# Patient Record
Sex: MALE | Race: WHITE | NOT HISPANIC OR LATINO | URBAN - METROPOLITAN AREA
[De-identification: names, ages, dates, MRNs, and addresses within clinical notes are randomized per-mention and may not be internally consistent; named-entity substitution may affect disease eponyms.]

---

## 2024-10-01 ENCOUNTER — TRANSCRIBE ORDERS (OUTPATIENT)
Dept: ADMINISTRATIVE | Facility: HOSPITAL | Age: 44
End: 2024-10-01
Payer: COMMERCIAL

## 2024-10-01 ENCOUNTER — HOSPITAL ENCOUNTER (OUTPATIENT)
Dept: GENERAL RADIOLOGY | Facility: HOSPITAL | Age: 44
Discharge: HOME | End: 2024-10-01
Admitting: ANESTHESIOLOGY
Payer: COMMERCIAL

## 2024-10-01 DIAGNOSIS — M54.16 LUMBAR RADICULOPATHY: Primary | ICD-10-CM

## 2024-10-01 DIAGNOSIS — M54.16 LUMBAR RADICULOPATHY: ICD-10-CM

## 2024-10-01 PROCEDURE — 72100 X-RAY EXAM L-S SPINE 2/3 VWS: CPT

## 2024-11-04 ENCOUNTER — TRANSCRIBE ORDERS (OUTPATIENT)
Dept: ADMINISTRATIVE | Facility: HOSPITAL | Age: 44
End: 2024-11-04
Payer: COMMERCIAL

## 2024-11-04 DIAGNOSIS — M54.16 LUMBAR RADICULOPATHY: Primary | ICD-10-CM

## 2024-11-05 ENCOUNTER — TRANSCRIBE ORDERS (OUTPATIENT)
Dept: ADMINISTRATIVE | Facility: HOSPITAL | Age: 44
End: 2024-11-05
Payer: COMMERCIAL

## 2024-11-05 DIAGNOSIS — M54.16 RADICULOPATHY, LUMBAR REGION: Primary | ICD-10-CM

## 2024-11-18 ENCOUNTER — HOSPITAL ENCOUNTER (OUTPATIENT)
Dept: MRI IMAGING | Facility: HOSPITAL | Age: 44
Discharge: HOME | End: 2024-11-18
Admitting: NURSE PRACTITIONER
Payer: COMMERCIAL

## 2024-11-18 DIAGNOSIS — M54.16 LUMBAR RADICULOPATHY: ICD-10-CM

## 2024-11-18 PROCEDURE — 72148 MRI LUMBAR SPINE W/O DYE: CPT

## 2025-01-07 ENCOUNTER — OFFICE VISIT (OUTPATIENT)
Dept: NEUROSURGERY | Facility: CLINIC | Age: 45
End: 2025-01-07
Payer: COMMERCIAL

## 2025-01-07 VITALS
HEART RATE: 70 BPM | SYSTOLIC BLOOD PRESSURE: 124 MMHG | HEIGHT: 73 IN | WEIGHT: 315 LBS | BODY MASS INDEX: 41.75 KG/M2 | DIASTOLIC BLOOD PRESSURE: 85 MMHG

## 2025-01-07 DIAGNOSIS — M48.062 SPINAL STENOSIS, LUMBAR REGION, WITH NEUROGENIC CLAUDICATION: ICD-10-CM

## 2025-01-07 DIAGNOSIS — M47.817 SPONDYLOSIS OF LUMBOSACRAL REGION, UNSPECIFIED SPINAL OSTEOARTHRITIS COMPLICATION STATUS: Primary | ICD-10-CM

## 2025-01-07 RX ORDER — ISOSORBIDE MONONITRATE 30 MG/1
1 TABLET, EXTENDED RELEASE ORAL DAILY
COMMUNITY

## 2025-01-07 RX ORDER — ROSUVASTATIN CALCIUM 40 MG/1
40 TABLET, COATED ORAL DAILY
COMMUNITY

## 2025-01-07 RX ORDER — GABAPENTIN 600 MG/1
600 TABLET ORAL 3 TIMES DAILY
COMMUNITY
Start: 2024-12-06

## 2025-01-07 RX ORDER — INSULIN GLARGINE 100 [IU]/ML
INJECTION, SOLUTION SUBCUTANEOUS
COMMUNITY

## 2025-01-07 RX ORDER — ASPIRIN 81 MG/1
81 TABLET, COATED ORAL DAILY
COMMUNITY

## 2025-01-07 RX ORDER — LOSARTAN POTASSIUM 100 MG/1
100 TABLET ORAL DAILY
COMMUNITY

## 2025-01-07 RX ORDER — DAPAGLIFLOZIN 10 MG/1
10 TABLET, FILM COATED ORAL DAILY
COMMUNITY

## 2025-01-07 RX ORDER — HYDROCODONE BITARTRATE AND ACETAMINOPHEN 7.5; 325 MG/1; MG/1
TABLET ORAL
COMMUNITY
End: 2025-01-07

## 2025-01-07 RX ORDER — AMANTADINE HYDROCHLORIDE 100 MG/1
TABLET ORAL
COMMUNITY
Start: 2024-12-03

## 2025-01-07 RX ORDER — CLOPIDOGREL BISULFATE 75 MG/1
75 TABLET ORAL DAILY
COMMUNITY

## 2025-01-07 RX ORDER — MAGNESIUM OXIDE 400 MG/1
1 TABLET ORAL DAILY
COMMUNITY
Start: 2024-09-10

## 2025-01-07 RX ORDER — EVOLOCUMAB 140 MG/ML
INJECTION, SOLUTION SUBCUTANEOUS
COMMUNITY
Start: 2024-12-27

## 2025-01-07 RX ORDER — HYDROCHLOROTHIAZIDE 25 MG/1
TABLET ORAL
COMMUNITY

## 2025-01-07 RX ORDER — BENZTROPINE MESYLATE 0.5 MG/1
TABLET ORAL
COMMUNITY

## 2025-01-07 RX ORDER — AMLODIPINE BESYLATE 5 MG/1
TABLET ORAL
COMMUNITY

## 2025-01-07 RX ORDER — FAMOTIDINE 20 MG/1
1 TABLET, FILM COATED ORAL EVERY 12 HOURS SCHEDULED
COMMUNITY
Start: 2024-11-23

## 2025-01-07 RX ORDER — SPIRONOLACTONE 25 MG/1
25 TABLET ORAL
COMMUNITY

## 2025-01-07 RX ORDER — AMIODARONE HYDROCHLORIDE 200 MG/1
1 TABLET ORAL DAILY
COMMUNITY
Start: 2024-11-04

## 2025-01-07 RX ORDER — CARVEDILOL 25 MG/1
25 TABLET ORAL 2 TIMES DAILY WITH MEALS
COMMUNITY

## 2025-01-07 RX ORDER — DULOXETIN HYDROCHLORIDE 60 MG/1
60 CAPSULE, DELAYED RELEASE ORAL DAILY
COMMUNITY

## 2025-01-07 RX ORDER — LIRAGLUTIDE 6 MG/ML
INJECTION SUBCUTANEOUS
COMMUNITY

## 2025-01-07 RX ORDER — ROPINIROLE 1 MG/1
1 TABLET, FILM COATED ORAL 3 TIMES DAILY
COMMUNITY
Start: 2024-09-10

## 2025-01-07 RX ORDER — ONDANSETRON 8 MG/1
8 TABLET, ORALLY DISINTEGRATING ORAL EVERY 8 HOURS PRN
COMMUNITY

## 2025-01-07 RX ORDER — CYCLOBENZAPRINE HCL 10 MG
TABLET ORAL
COMMUNITY

## 2025-01-07 NOTE — PROGRESS NOTES
Chief Complaint  Establish Care (NEW PATIENT_RADICULOPATHY, LUMBAR REGION_MRI LSPINE 11-18-24@ [facility]) and Back Pain (Lower back and radiates down both legs causing a numbing sensation he stated the pain also radiated up to his mid back causing a aching/throbbing sensation. Ongoing for years but has recently gotten worse. //Patient denies any recent Physical therapy or pain management. )    Subjective          Guevara Joseph who is a 44 y.o. year old male who presents to [facility] NEUROLOGY [facility]GERY for evaluation of     History of Present Illness  The patient is a 44-year-old male presenting for low back pain radiating to the legs.    He has been experiencing persistent low back pain since his mid-teens, which has recently intensified. He has diabetic neuropathy in his legs. This condition forces him to exert more pressure on his back as a result of severe foot pain. The pain, which he rates as 5.5 out of 10, escalates to an 8 or 9 by midday and persists even during sleep. He also experiences pain radiating down the back of his legs, although the extent is unclear due to numbness from the calves down. He reports constant leg weakness, which has progressively worsened recently, particularly when standing. He has not engaged in any recent physical therapy. He recalls an incident from his youth where a refrigerator fell on him, necessitating a hospital visit. He is currently unemployed and disabled. He has had recent falls, including one in the shower due to a slippery mat. He has received back injections in the past, but these were ineffective.     He saw [facility] pain [facility] spine and is scheduled for a follow up with pain management on 01/22/2025. He is not currently on any pain medication but takes gabapentin for his neuropathy as well as Cymbalta. He was previously prescribed hydrocodone but has not taken it for some time.    He has gained around 40 pounds in the past year. He  "attributes his weight gain to his inability to exercise due to leg weakness    He has a history of CABG x3 several years ago and is currently taking Plavix. He recently moved to the area and has not established care with a cardiologist.     Supplemental Information  He has difficulty with bowel movements without Linzess, which he can no longer obtain due to insurance issues. He has a history of blockage in his carotid arteries and has undergone a triple bypass surgery.    MEDICATIONS  Current: gabapentin, Cymbalta  Past: hydrocodone, amitriptyline     History of Previous Spinal Surgery?: No    Nicotine use: non-smoker    BMI: Body mass index is 45.27 kg/m².      Review of Systems   Musculoskeletal:  Positive for arthralgias, back pain, gait problem and myalgias.   Neurological:  Positive for weakness and numbness.   All other systems reviewed and are negative.       Objective   Vital Signs:   /85 (BP Location: Left arm, Patient Position: Sitting, Cuff Size: Adult)   Pulse 70   Ht 185.4 cm (73\")   Wt (!) 156 kg (343 lb 1.6 oz)   BMI 45.27 kg/m²       Physical Exam  Vitals reviewed.   Constitutional:       Appearance: Normal appearance.   Musculoskeletal:      Lumbar back: Tenderness present. Negative right straight leg raise test and negative left straight leg raise test.      Right hip: No tenderness. Normal range of motion.      Left hip: No tenderness. Normal range of motion.      Comments: Diffuse myofascial tenderness to palpation in the lower back.    Neurological:      Mental Status: He is alert.      Deep Tendon Reflexes:      Reflex Scores:       Patellar reflexes are 0 on the right side and 0 on the left side.       Achilles reflexes are 0 on the right side and 0 on the left side.       Neurological Exam  Mental Status  Alert.    Motor   Strength is 5/5 in all four extremities except as noted.  Some weakness in the PF on the left.    Sensory  Light touch abnormality: Reduced sensation distal to " the knees..     Reflexes                                            Right                      Left  Patellar                                0                         0  Achilles                                0                         0    Gait  Casual gait: Unsteady. Antalgic gait.      Physical Exam         Result Review :       Data reviewed : Radiologic studies MRI Lumbar Spine on 11/18/24 at [facility] personally reviewed and interpreted. Spondylosis in the lower lumbar spine with endplate changes at L4/5. At L3/4 and L4/5 there are central disc protrusion superimposed upon a broad based disc bulge, resulting in mild to moderate spinal stenosis at L3/4 and severe spinal stenosis at L4/5. At L5/S1 there is 2mm retrolisthesis, no significant spinal stenosis.           Assessment and Plan    Diagnoses and all orders for this visit:    1. Spondylosis of lumbosacral region, unspecified spinal osteoarthritis complication status (Primary)  -     Ambulatory Referral to Physical Therapy for Evaluation & Treatment    2. Spinal stenosis, lumbar region, with neurogenic claudication  -     Ambulatory Referral to Physical Therapy for Evaluation & Treatment        Assessment & Plan  1. Low back pain.  The patient's MRI reveals significant disc degeneration and bulging at L3-4 to L5-S1, with the most severe issues at L4-5, including a disc bulge, tear, and herniation causing severe spinal canal narrowing. This has led to chronic pain and progressive leg weakness. The peripheral neuropathy may also contribute to balance issues and leg weakness. Given his history of diabetes, obesity, and cardiac issues, surgery is not immediately recommended. A referral for physical therapy will be made to address leg weakness and back pain. He will need to establish care with a cardiologist for clearance before any potential surgery. Notes from this visit will be forwarded to his pain management specialist and primary care physician.    2.  Peripheral neuropathy.  The patient reports neuropathy in his legs, contributing to increased back pain due to altered gait and pressure distribution. He is currently taking gabapentin and Cymbalta for neuropathic pain. He will continue these medications. The role of peripheral neuropathy in his symptoms was discussed, and it was noted that even with potential surgical intervention for his back, neuropathy might still cause residual symptoms.    Follow-up  The patient will follow up in 6 to 8 weeks.         Follow Up   Return in about 8 weeks (around 3/4/2025).  Patient was given instructions and counseling regarding his condition or for health maintenance advice.     Patient or patient representative verbalized consent for the use of Ambient Listening during the visit with  DESMOND Ruvalcaba for chart documentation. 1/8/2025  14:19 EST    -Physical therapy  -Obtain a Cardiologist  -Follow up in 8 weeks

## 2025-01-09 ENCOUNTER — PATIENT ROUNDING (BHMG ONLY) (OUTPATIENT)
Dept: NEUROSURGERY | Facility: CLINIC | Age: 45
End: 2025-01-09
Payer: COMMERCIAL

## 2025-02-17 ENCOUNTER — HOSPITAL ENCOUNTER (OUTPATIENT)
Dept: GENERAL RADIOLOGY | Facility: HOSPITAL | Age: 45
Discharge: HOME | End: 2025-02-17
Admitting: NURSE PRACTITIONER
Payer: COMMERCIAL

## 2025-02-17 ENCOUNTER — TRANSCRIBE ORDERS (OUTPATIENT)
Dept: LAB | Facility: HOSPITAL | Age: 45
End: 2025-02-17
Payer: COMMERCIAL

## 2025-02-17 DIAGNOSIS — M54.50 LOW BACK PAIN, UNSPECIFIED BACK PAIN LATERALITY, UNSPECIFIED CHRONICITY, UNSPECIFIED WHETHER SCIATICA PRESENT: ICD-10-CM

## 2025-02-17 DIAGNOSIS — M54.50 LOW BACK PAIN, UNSPECIFIED BACK PAIN LATERALITY, UNSPECIFIED CHRONICITY, UNSPECIFIED WHETHER SCIATICA PRESENT: Primary | ICD-10-CM

## 2025-02-17 PROCEDURE — 72100 X-RAY EXAM L-S SPINE 2/3 VWS: CPT

## 2025-03-12 ENCOUNTER — OFFICE VISIT (OUTPATIENT)
Dept: CARDIOLOGY | Facility: CLINIC | Age: 45
End: 2025-03-12
Payer: COMMERCIAL

## 2025-03-12 VITALS
OXYGEN SATURATION: 96 % | WEIGHT: 315 LBS | HEIGHT: 73 IN | DIASTOLIC BLOOD PRESSURE: 50 MMHG | HEART RATE: 85 BPM | SYSTOLIC BLOOD PRESSURE: 112 MMHG | BODY MASS INDEX: 41.75 KG/M2

## 2025-03-12 DIAGNOSIS — I25.810 CORONARY ARTERY DISEASE INVOLVING CORONARY BYPASS GRAFT OF NATIVE HEART WITHOUT ANGINA PECTORIS: Primary | ICD-10-CM

## 2025-03-12 DIAGNOSIS — I10 ESSENTIAL HYPERTENSION: ICD-10-CM

## 2025-03-12 DIAGNOSIS — I47.10 PSVT (PAROXYSMAL SUPRAVENTRICULAR TACHYCARDIA): ICD-10-CM

## 2025-03-12 DIAGNOSIS — E78.5 HYPERLIPIDEMIA LDL GOAL <55: ICD-10-CM

## 2025-03-12 DIAGNOSIS — I65.22 CAROTID ARTERY STENOSIS, SYMPTOMATIC, LEFT: ICD-10-CM

## 2025-03-12 PROBLEM — E11.9 DM (DIABETES MELLITUS), TYPE 2: Status: ACTIVE | Noted: 2025-03-12

## 2025-03-12 PROCEDURE — 3074F SYST BP LT 130 MM HG: CPT | Performed by: INTERNAL MEDICINE

## 2025-03-12 PROCEDURE — 93000 ELECTROCARDIOGRAM COMPLETE: CPT | Performed by: INTERNAL MEDICINE

## 2025-03-12 PROCEDURE — 3078F DIAST BP <80 MM HG: CPT | Performed by: INTERNAL MEDICINE

## 2025-03-12 PROCEDURE — 99204 OFFICE O/P NEW MOD 45 MIN: CPT | Performed by: INTERNAL MEDICINE

## 2025-03-12 RX ORDER — AMIODARONE HYDROCHLORIDE 100 MG/1
100 TABLET ORAL DAILY
Qty: 90 TABLET | Refills: 3 | Status: SHIPPED | OUTPATIENT
Start: 2025-03-12

## 2025-03-12 RX ORDER — ERGOCALCIFEROL 1.25 MG/1
1 CAPSULE, LIQUID FILLED ORAL WEEKLY
COMMUNITY
Start: 2025-02-17

## 2025-03-12 NOTE — ASSESSMENT & PLAN NOTE
Patient with no overt anginal symptoms continue chronic aspirin 81 and Plavix 75 daily dosing.  Patient was also counseled on if possible to stop vaping as it is unknown the potential cardiovascular risk of this and would counseled the same as smoking cessation.  Patient has not been have any anginal symptoms his EKG remains unchanged will check echocardiogram but feel that he could proceed with surgery is at moderate risk and hold Plavix 75 mg 5 days prior to procedure but will try to continue aspirin 81 daily.

## 2025-03-12 NOTE — ASSESSMENT & PLAN NOTE
Blood pressure is well-controlled currently will continue losartan 100 mg once a day and Coreg 25 twice daily dose

## 2025-03-12 NOTE — ASSESSMENT & PLAN NOTE
Patient is on high intensity Crestor 40 nightly as well as Repatha reports compliance with these and no side effects we will repeat his lipids to see if at goal

## 2025-03-12 NOTE — ASSESSMENT & PLAN NOTE
Socially patient is a type of paroxysmal SVT prior to his diagnosis for any bypass given his young age recommended try to titrate off and decrease to 100 once a day and see how he does symptomatically

## 2025-03-12 NOTE — PROGRESS NOTES
Chief Complaint  Post Coronary Artery Bypass Grafting, Stenosis of Carotid Artery, Occlusion, HD, and Establish Care      History of Present Illness  Guevara Joseph Jr. presents to [facility] CARDIOLOGY  Patient is a 44-year-old with CAD previous three-vessel CABG in 23, PSVT at that time during his admission started on amiodarone therapy, essential retention, dyslipidemia, and carotid artery stenosis who is here establish a cardiologist.  He reports no anginal chest pain or dyspnea on exertion problems he is ambulatory but does have some limitations due to back and leg pain problems.  He is scheduled to get evaluated for surgery.  He used to smoke but now has a change of vaping    Past Medical History:   Diagnosis Date    COPD (chronic obstructive pulmonary disease)     Diabetes mellitus     Heart attack     x2    Hypertension     Lumbosacral disc disease     Peripheral neuropathy          Current Outpatient Medications:     amantadine (SYMMETREL) 100 MG tablet, , Disp: , Rfl:     Aspirin Low Dose 81 MG EC tablet, Take 1 tablet by mouth Daily., Disp: , Rfl:     carvedilol (COREG) 25 MG tablet, Take 1 tablet by mouth 2 (Two) Times a Day With Meals., Disp: , Rfl:     clopidogrel (PLAVIX) 75 MG tablet, Take 1 tablet by mouth Daily., Disp: , Rfl:     DULoxetine (CYMBALTA) 60 MG capsule, Take 1 capsule by mouth Daily., Disp: , Rfl:     famotidine (PEPCID) 20 MG tablet, Take 1 tablet by mouth Every 12 (Twelve) Hours., Disp: , Rfl:     gabapentin (NEURONTIN) 600 MG tablet, Take 1 tablet by mouth 3 (Three) Times a Day., Disp: , Rfl:     Insulin Glargine (Lantus SoloStar) 100 UNIT/ML injection pen, , Disp: , Rfl:     losartan (COZAAR) 100 MG tablet, Take 1 tablet by mouth Daily., Disp: , Rfl:     magnesium oxide (MAG-OX) 400 MG tablet, Take 1 tablet by mouth Daily. with food, Disp: , Rfl:     Repatha solution prefilled syringe injection, ADMINISTER 1ML UNDER THE SKIN EVERY 14 DAYS, Disp: , Rfl:      rOPINIRole (REQUIP) 1 MG tablet, Take 1 tablet by mouth 3 (Three) Times a Day., Disp: , Rfl:     rosuvastatin (CRESTOR) 40 MG tablet, Take 1 tablet by mouth Daily., Disp: , Rfl:     vitamin D (ERGOCALCIFEROL) 1.25 MG (92392 UT) capsule capsule, Take 1 capsule by mouth 1 (One) Time Per Week., Disp: , Rfl:     amiodarone (PACERONE) 100 MG tablet, Take 1 tablet by mouth Daily., Disp: 90 tablet, Rfl: 3    cyclobenzaprine (FLEXERIL) 10 MG tablet, , Disp: , Rfl:     Medications Discontinued During This Encounter   Medication Reason    amLODIPine (NORVASC) 5 MG tablet     benztropine (COGENTIN) 0.5 MG tablet     dapagliflozin Propanediol 10 MG tablet     hydroCHLOROthiazide 25 MG tablet     isosorbide mononitrate (IMDUR) 30 MG 24 hr tablet     Liraglutide (Victoza) 18 MG/3ML solution pen-injector injection     omeprazole (priLOSEC) 20 MG capsule     ondansetron ODT (ZOFRAN-ODT) 8 MG disintegrating tablet     spironolactone (ALDACTONE) 25 MG tablet     amiodarone (PACERONE) 200 MG tablet      Allergies   Allergen Reactions    Metformin Diarrhea        Social History     Tobacco Use    Smoking status: Former     Current packs/day: 1.50     Average packs/day: 1.5 packs/day for 15.0 years (22.5 ttl pk-yrs)     Types: Cigarettes     Passive exposure: Past    Smokeless tobacco: Never   Vaping Use    Vaping status: Former    Substances: Nicotine    Devices: Disposable   Substance Use Topics    Alcohol use: Not Currently    Drug use: Never       Family History   Problem Relation Age of Onset    Asthma Mother     Heart disease Mother     Diabetes Mother     Stroke Father     COPD Father     Heart disease Father     Diabetes Father     Diabetes Sister     Other (Other) Maternal Aunt         heart attack    Diabetes Maternal Aunt     Other (Other) Maternal Grandmother         heart attack    Other (Other) Paternal Grandmother         heart attack        Objective     /50 (BP Location: Left arm, Patient Position: Sitting, Cuff  "Size: Large Adult)   Pulse 85   Ht 185.4 cm (73\")   Wt (!) 160 kg (352 lb 6.4 oz)   SpO2 96%   BMI 46.49 kg/m²       Physical Exam    General Appearance:   no acute distress  Alert and oriented x3  HENT:   lips not cyanotic  Atraumatic  Neck:  No jvd   supple  Respiratory:  no respiratory distress  normal breath sounds  no rales  Cardiovascular:  Regular rate and rhythm  no S3, no S4   no murmur  no rub  Extremities  No cyanosis  lower extremity edema: none    Skin:   warm, dry  No rashes    Result Review :     No results found for: \"PROBNP\"       No results found for: \"TSH\"   No results found for: \"FREET4\"   No results found for: \"DDIMERQUANT\"  No results found for: \"MG\"   No results found for: \"DIGOXIN\"   No results found for: \"TROPONINT\"   No results found for: \"POCTROP\"(             ponent   Ref Range & Units2 yr ago Sodium   133 - 144 mmol/L133 Potassium   3.6 - 5.2 mmol/L4.0 Chloride   98 - 107 mmol/L100 CO2   21 - 32 mmol/L19 Low  Anion Gap   5 - 15 mmol/L14 BUN   7 - 18 mg/dL10 Creatinine   0.70 - 1.30 mg/dL0.70 BUN/Creatinine Ratio   10.00 - 20.00 ratio14.29 Glucose   70 - 110 mg/dL137 High  Calcium   8.5 - 10.1 mg/dL9.3 AST   15 - 37 U/L9 Low  ALT (SGPT)   16 - 61 U/L14 Low  Alkaline Phosphatase   45 - 117 U/L131 High  Total Protein   6.4 - 8.4 g/dL6.2 Low  Albumin   3.4 - 5.0 g/dL2.6 Low  Globulin, Total   2.4 - 4.8 g/dL3.6 A/G Ratio   0.6 - 1.6<1.0 Total Bilirubin   0.0 - 1.0 mg/dL1.0 eGFR   >60.0 mL/min/1.73m*2117.2    onent   Ref Range & Units2 yr ago Glucose   70 - 110 mg/dL165 High  Sodium   133 - 144 mmol/L135 Potassium   3.6 - 5.2 mmol/L3.0 Low  Chloride   98 - 107 mmol/L98 CO2   21 - 32 mmol/L27 Anion Gap   5 - 15 mmol/L10 BUN   7 - 18 mg/dL11 Creatinine   0.70 - 1.30 mg/dL0.60 Low  BUN/Creatinine Ratio   10.00 - 20.00 ratio18.33 Calcium   8.5 - 10.1 mg/dL9.1 eGFR   >60.0 mL/min/1.73m*2124.9 Resulting Agency                               ECG 12 Lead    Date/Time: 3/12/2025 2:05 PM  Performed " by: Sebastien Aguillon MD    Authorized by: Sebastien Aguillon MD  Comparison: compared with previous ECG   Similar to previous ECG  Rhythm: sinus rhythm  Conduction: non-specific intraventricular conduction delay  Comments: Inferior MI old         No results found for this or any previous visit.           Results for orders placed during the hospital encounter of 05/15/24    Duplex venous lower extremity right CAR    Interpretation Summary    There was superficial venous valvular incompetence noted in the right saphenofemoral junction.    All other right sided veins appeared normal.    The ASCVD Risk score (Yrn HAIRSTON, et al., 2019) failed to calculate for the following reasons:    Risk score cannot be calculated because patient has a medical history suggesting prior/existing ASCVD     Diagnoses and all orders for this visit:    1. CAD status post CABG (Primary)  Overview:  Cabg x3vls 1/23    Assessment & Plan:  Patient with no overt anginal symptoms continue chronic aspirin 81 and Plavix 75 daily dosing.  Patient was also counseled on if possible to stop vaping as it is unknown the potential cardiovascular risk of this and would counseled the same as smoking cessation.  Patient has not been have any anginal symptoms his EKG remains unchanged will check echocardiogram but feel that he could proceed with surgery is at moderate risk and hold Plavix 75 mg 5 days prior to procedure but will try to continue aspirin 81 daily.      Orders:  -     Adult Transthoracic Echo Complete W/ Cont if Necessary Per Protocol; Future    2. Hyperlipidemia LDL goal <55  Assessment & Plan:  Patient is on high intensity Crestor 40 nightly as well as Repatha reports compliance with these and no side effects we will repeat his lipids to see if at goal     Orders:  -     Hepatic Function Panel; Future  -     Lipid Panel; Future  -     Basic Metabolic Panel; Future    3. PSVT (paroxysmal supraventricular tachycardia)  Assessment & Plan:  Socially  patient is a type of paroxysmal SVT prior to his diagnosis for any bypass given his young age recommended try to titrate off and decrease to 100 once a day and see how he does symptomatically    Orders:  -     Adult Transthoracic Echo Complete W/ Cont if Necessary Per Protocol; Future  -     Thyroid Panel With TSH; Future    4. Essential hypertension  Assessment & Plan:  Blood pressure is well-controlled currently will continue losartan 100 mg once a day and Coreg 25 twice daily dose       5. Carotid artery stenosis, symptomatic, left  -     Cancel: US Carotid Bilateral; Future  -     Duplex Carotid Ultrasound CAR; Future    Other orders  -     amiodarone (PACERONE) 100 MG tablet; Take 1 tablet by mouth Daily.  Dispense: 90 tablet; Refill: 3  -     ECG 12 Lead            Follow Up     Return in about 6 months (around 9/12/2025) for Follow with Marisabel Dunn.          Patient was given instructions and counseling regarding his condition or for health maintenance advice. Please see specific information pulled into the AVS if appropriate.

## 2025-03-19 ENCOUNTER — OFFICE VISIT (OUTPATIENT)
Dept: NEUROSURGERY | Facility: CLINIC | Age: 45
End: 2025-03-19
Payer: COMMERCIAL

## 2025-03-19 VITALS
SYSTOLIC BLOOD PRESSURE: 129 MMHG | WEIGHT: 315 LBS | DIASTOLIC BLOOD PRESSURE: 85 MMHG | HEIGHT: 73 IN | BODY MASS INDEX: 41.75 KG/M2

## 2025-03-19 DIAGNOSIS — M51.26 DISPLACEMENT OF LUMBAR INTERVERTEBRAL DISC WITHOUT MYELOPATHY: ICD-10-CM

## 2025-03-19 DIAGNOSIS — M48.062 SPINAL STENOSIS, LUMBAR REGION, WITH NEUROGENIC CLAUDICATION: ICD-10-CM

## 2025-03-19 DIAGNOSIS — M47.27 OSTEOARTHRITIS OF SPINE WITH RADICULOPATHY, LUMBOSACRAL REGION: Primary | ICD-10-CM

## 2025-03-19 NOTE — PROGRESS NOTES
Chief Complaint  Back Pain    Subjective          Guevara Joseph JrKayleigh who is a 44 y.o. year old male who presents to [facility] for follow up after physical therapy.     History of Present Illness  The patient is a 44-year-old male following up after physical therapy.    He has been actively participating in physical therapy sessions twice a week, which he finds beneficial for mobility, despite the associated pain. He reports that the therapy has not significantly improved his leg strength but acknowledges its role in maintaining his motivation and activity levels. He has also consulted with Pain Management, who have refrained from any interventions pending further input from our team. His next appointment with them is scheduled for Tuesday. He experiences constant pain, primarily localized in his back. His last epidural injection was administered a considerable time ago. He is currently on gabapentin for pain management. He notes that his leg pain appears to be contingent on his back pain and neuropathy, suggesting a complex interplay between these conditions.    Supplemental Information  He saw Dr. Aguillon and got in with Cardiology, who ordered an echocardiogram and a carotid artery test for 05/02/2025. He did note that he feels the patient is stable enough to hold anticoagulants for potential surgery or interventional management.     MEDICATIONS  Current: gabapentin       Interval History 1/7/25    The patient is a 44-year-old male presenting for low back pain radiating to the legs.     He has been experiencing persistent low back pain since his mid-teens, which has recently intensified. He has diabetic neuropathy in his legs. This condition forces him to exert more pressure on his back as a result of severe foot pain. The pain, which he rates as 5.5 out of 10, escalates to an 8 or 9 by midday and persists even during sleep. He also experiences pain radiating down the back  "of his legs, although the extent is unclear due to numbness from the calves down. He reports constant leg weakness, which has progressively worsened recently, particularly when standing. He has not engaged in any recent physical therapy. He recalls an incident from his youth where a refrigerator fell on him, necessitating a hospital visit. He is currently unemployed and disabled. He has had recent falls, including one in the shower due to a slippery mat. He has received back injections in the past, but these were ineffective.      He saw [facility] and is scheduled for a follow up with pain management on 01/22/2025. He is not currently on any pain medication but takes gabapentin for his neuropathy as well as Cymbalta. He was previously prescribed hydrocodone but has not taken it for some time.     He has gained around 40 pounds in the past year. He attributes his weight gain to his inability to exercise due to leg weakness     He has a history of CABG x3 several years ago and is currently taking Plavix. He recently moved to the area and has not established care with a cardiologist.      Supplemental Information  He has difficulty with bowel movements without Linzess, which he can no longer obtain due to insurance issues. He has a history of blockage in his carotid arteries and has undergone a triple bypass surgery.     MEDICATIONS  Current: gabapentin, Cymbalta  Past: hydrocodone, amitriptyline     History of Previous Spinal Surgery?: No     Nicotine use: non-smoker     BMI: Body mass index is 45.27 kg/m².    Recent Interventions: PT      Review of Systems   Musculoskeletal:  Positive for arthralgias, back pain, gait problem and myalgias.   Neurological:  Positive for weakness and numbness.   All other systems reviewed and are negative.       Objective   Vital Signs:   /85 (BP Location: Left arm, Patient Position: Sitting)   Ht 185.4 cm (73\")   Wt (!) 160 kg (352 lb 9.6 oz)   BMI 46.52 " kg/m²       Physical Exam  Vitals reviewed.   Constitutional:       Appearance: Normal appearance.   Musculoskeletal:      Lumbar back: Tenderness present. Negative right straight leg raise test and negative left straight leg raise test.      Right hip: No tenderness. Normal range of motion.      Left hip: No tenderness. Normal range of motion.      Comments: Diffuse myofascial tenderness to palpation in the lower back.    Neurological:      Mental Status: He is alert.      Deep Tendon Reflexes:      Reflex Scores:       Patellar reflexes are 0 on the right side and 0 on the left side.       Achilles reflexes are 0 on the right side and 0 on the left side.       Neurological Exam  Mental Status  Alert.    Motor   Strength is 5/5 in all four extremities except as noted.  Some weakness in the PF on the left.    Sensory  Light touch abnormality: Reduced sensation distal to the knees..     Reflexes                                            Right                      Left  Patellar                                0                         0  Achilles                                0                         0    Gait  Casual gait: Unsteady. Antalgic gait.      Physical Exam         Result Review :       Data reviewed : Radiologic studies MRI Lumbar Spine on 11/18/24 at [facility] personally reviewed and interpreted. Spondylosis in the lower lumbar spine with endplate changes at L4/5. At L3/4 and L4/5 there are central disc protrusion superimposed upon a broad based disc bulge, resulting in mild to moderate spinal stenosis at L3/4 and severe spinal stenosis at L4/5. At L5/S1 there is 2mm retrolisthesis, no significant spinal stenosis.             Assessment and Plan    Diagnoses and all orders for this visit:    1. Osteoarthritis of spine with radiculopathy, lumbosacral region (Primary)  -     Ambulatory Referral to Pain Management    2. Displacement of lumbar intervertebral disc without myelopathy  -     Ambulatory Referral to  Pain Management    3. Spinal stenosis, lumbar region, with neurogenic claudication  -     Ambulatory Referral to Pain Management        Assessment & Plan  1. Spinal stenosis.  He has significant degenerative changes in his back, including spinal stenosis. The primary consideration for surgery would be to alleviate leg pain rather than back pain. He has three degenerated and bulging discs, with L4-5 being the most significant. Shaving the disc at the L4-5 region will not resolve all issues. His  back pain is worse than his leg pain symptoms, however both persist. We discussed that surgery would not improve low back pain. An epidural injection is recommended as the next step to potentially provide pain relief and guide further treatment decisions. Dr. Aguillon has indicated that he is comfortable with the patient discontinuing blood thinners for a certain period, which is necessary for the epidural injection.    2. Back pain.  He reports continuous back pain, which is not significantly alleviated by physical therapy. Pain management is a crucial consideration before contemplating surgery. An epidural injection is recommended to address the back pain and assess its effectiveness before considering surgical options.    Follow-up  The patient will follow up after receiving the epidural injection, preferably on a day when Dr. Giles is available, to discuss potential surgical options if necessary.       I spent 32 minutes caring for Guevara on this date of service. This time includes time spent by me in the following activities:preparing for the visit, reviewing tests, obtaining and/or reviewing a separately obtained history, performing a medically appropriate examination and/or evaluation , counseling and educating the patient/family/caregiver, referring and communicating with other health care professionals , documenting information in the medical record, independently interpreting results and communicating that information  with the patient/family/caregiver, and care coordination.    Follow Up   Return in about 8 weeks (around 5/14/2025).  Patient was given instructions and counseling regarding his condition or for health maintenance advice.     Patient or patient representative verbalized consent for the use of Ambient Listening during the visit with  DESMOND Ruvalcaba for chart documentation. 3/19/2025  13:54 EDT

## 2025-03-26 ENCOUNTER — TELEPHONE (OUTPATIENT)
Dept: CARDIOLOGY | Facility: CLINIC | Age: 45
End: 2025-03-26
Payer: COMMERCIAL

## 2025-03-26 NOTE — TELEPHONE ENCOUNTER
The [facility] received a fax that requires your attention. The document has been indexed to the patient’s chart for your review.      Reason for sending: EXTERNAL MEDICAL RECORD NOTIFICATION     Documents Description: CARDIAC CLEARANCE REQ-[facility]-3.25.25    Name of Sender: [facility]     Date Indexed: 3.25.25

## 2025-04-10 ENCOUNTER — TELEPHONE (OUTPATIENT)
Dept: CARDIOLOGY | Facility: CLINIC | Age: 45
End: 2025-04-10
Payer: COMMERCIAL

## 2025-04-10 ENCOUNTER — TELEPHONE (OUTPATIENT)
Dept: NEUROSURGERY | Facility: CLINIC | Age: 45
End: 2025-04-10
Payer: COMMERCIAL

## 2025-04-10 NOTE — TELEPHONE ENCOUNTER
PT CALLED: STATES HE IS IN HIS 2ND COURSE OF PT AND HAS ONLY COMPLETED 2-3 VISITS THIS ROUND. STATES HE DOES OKAY WHEN HE DOES THE PT BUT AFTERWARDS THE PAIN RETURNS WORSE AFTER EACH VISIT. DOES NOT TAKE TYLENOL OR IBUPROFEN D/T UPSETS HIS STOMACH AND STATES HEAT AND ICE USUALLY DOES NOT HELP BUT HAS NOT TRIED IT RECENTLY. DENIES B/B ISSUES. STATES PAIN RADIATES DOWN BOTH LEGS. PLEASE ADVISE! THANKS!    LAST SEEN: Office Visit with Patricia Celestin APRN (03/19/2025)     NEXT FOLLOW UP: 5/13/25      PT CONTACT (SPOUSE'S PHONE NUMBER D/T ISSUES W/HIS PHONE): [phone]  BEST TIME TO CALL: ANYTIME

## 2025-04-10 NOTE — TELEPHONE ENCOUNTER
Caller: Guevara Joseph Jr.    Relationship: Self    Best call back number: [phone]    What is the medical concern/diagnosis: N/A    What specialty or service is being requested: LAB WORK REFERRAL    What is the provider, practice or medical service name:     FROM: DR. BROWER    TO: DESMOND EAGLE WITH [facility]     What is the office location: [address] [place] [address]     What is the office phone number: [phone]    Any additional details: PLS CALL PT'S SPOUSE'S NUMBER FOR ANY QUESTIONS, CONCERNS, OR UPDATES.    LAB WORK WAS REQUESTED BY DR. BROWER AND TEAM BUT THE PT IS HAVING IT DONE AT THE ABOVE FACILITY. A REFERRAL NEEDS TO BE MADE TO THE ABOVE PROVIDER IN ORDER FOR PT TO GET IT DONE WITH THEM.

## 2025-04-11 NOTE — TELEPHONE ENCOUNTER
Left voicemail to advise we can move patient's follow up to a sooner date with Patricia. Ok for hub to relay/schedule next available follow up.

## 2025-04-16 ENCOUNTER — OFFICE VISIT (OUTPATIENT)
Dept: NEUROSURGERY | Facility: CLINIC | Age: 45
End: 2025-04-16
Payer: COMMERCIAL

## 2025-04-16 VITALS
DIASTOLIC BLOOD PRESSURE: 118 MMHG | HEIGHT: 73 IN | BODY MASS INDEX: 41.75 KG/M2 | WEIGHT: 315 LBS | SYSTOLIC BLOOD PRESSURE: 173 MMHG

## 2025-04-16 DIAGNOSIS — M51.26 DISPLACEMENT OF LUMBAR INTERVERTEBRAL DISC WITHOUT MYELOPATHY: ICD-10-CM

## 2025-04-16 DIAGNOSIS — M47.27 OSTEOARTHRITIS OF SPINE WITH RADICULOPATHY, LUMBOSACRAL REGION: Primary | ICD-10-CM

## 2025-04-16 DIAGNOSIS — M48.062 SPINAL STENOSIS, LUMBAR REGION, WITH NEUROGENIC CLAUDICATION: ICD-10-CM

## 2025-04-16 RX ORDER — FLUCONAZOLE 150 MG/1
150 TABLET ORAL DAILY
COMMUNITY
Start: 2025-04-14

## 2025-04-16 RX ORDER — HYDROCODONE BITARTRATE AND ACETAMINOPHEN 5; 325 MG/1; MG/1
1 TABLET ORAL EVERY 8 HOURS PRN
Qty: 21 TABLET | Refills: 0 | Status: SHIPPED | OUTPATIENT
Start: 2025-04-16

## 2025-05-02 ENCOUNTER — HOSPITAL ENCOUNTER (OUTPATIENT)
Facility: HOSPITAL | Age: 45
Discharge: HOME | End: 2025-05-02
Payer: COMMERCIAL

## 2025-05-02 ENCOUNTER — HOSPITAL ENCOUNTER (OUTPATIENT)
Dept: CARDIOLOGY | Facility: HOSPITAL | Age: 45
Discharge: HOME | End: 2025-05-02
Payer: COMMERCIAL

## 2025-05-02 DIAGNOSIS — I65.22 CAROTID ARTERY STENOSIS, SYMPTOMATIC, LEFT: ICD-10-CM

## 2025-05-02 DIAGNOSIS — I25.810 CORONARY ARTERY DISEASE INVOLVING CORONARY BYPASS GRAFT OF NATIVE HEART WITHOUT ANGINA PECTORIS: ICD-10-CM

## 2025-05-02 DIAGNOSIS — I47.10 PSVT (PAROXYSMAL SUPRAVENTRICULAR TACHYCARDIA): ICD-10-CM

## 2025-05-02 LAB
BH CV XLRA MEAS LEFT CAROTID BULB EDV: 31.5 CM/SEC
BH CV XLRA MEAS LEFT CAROTID BULB PSV: 65.5 CM/SEC
BH CV XLRA MEAS LEFT DIST CCA EDV: 38.4 CM/SEC
BH CV XLRA MEAS LEFT DIST CCA PSV: 119.2 CM/SEC
BH CV XLRA MEAS LEFT DIST ICA EDV: 32.1 CM/SEC
BH CV XLRA MEAS LEFT DIST ICA PSV: 58.9 CM/SEC
BH CV XLRA MEAS LEFT ICA/CCA RATIO: 0.66
BH CV XLRA MEAS LEFT MID ICA EDV: 38.1 CM/SEC
BH CV XLRA MEAS LEFT MID ICA PSV: 80.3 CM/SEC
BH CV XLRA MEAS LEFT PROX CCA EDV: 30.6 CM/SEC
BH CV XLRA MEAS LEFT PROX CCA PSV: 95.4 CM/SEC
BH CV XLRA MEAS LEFT PROX ECA EDV: 48.3 CM/SEC
BH CV XLRA MEAS LEFT PROX ECA PSV: 206.5 CM/SEC
BH CV XLRA MEAS LEFT PROX ICA EDV: 41.1 CM/SEC
BH CV XLRA MEAS LEFT PROX ICA PSV: 79.1 CM/SEC
BH CV XLRA MEAS LEFT VERTEBRAL A EDV: 21.2 CM/SEC
BH CV XLRA MEAS LEFT VERTEBRAL A PSV: 71.9 CM/SEC
BH CV XLRA MEAS RIGHT CAROTID BULB EDV: 36.3 CM/SEC
BH CV XLRA MEAS RIGHT CAROTID BULB PSV: 101.9 CM/SEC
BH CV XLRA MEAS RIGHT DIST CCA EDV: 39.4 CM/SEC
BH CV XLRA MEAS RIGHT DIST CCA PSV: 118.9 CM/SEC
BH CV XLRA MEAS RIGHT DIST ICA EDV: 35.7 CM/SEC
BH CV XLRA MEAS RIGHT DIST ICA PSV: 97.8 CM/SEC
BH CV XLRA MEAS RIGHT ICA/CCA RATIO: 1.5
BH CV XLRA MEAS RIGHT MID ICA EDV: 57.1 CM/SEC
BH CV XLRA MEAS RIGHT MID ICA PSV: 107.8 CM/SEC
BH CV XLRA MEAS RIGHT PROX CCA EDV: 27.8 CM/SEC
BH CV XLRA MEAS RIGHT PROX CCA PSV: 101.6 CM/SEC
BH CV XLRA MEAS RIGHT PROX ECA EDV: 47.2 CM/SEC
BH CV XLRA MEAS RIGHT PROX ECA PSV: 216.4 CM/SEC
BH CV XLRA MEAS RIGHT PROX ICA EDV: 74.3 CM/SEC
BH CV XLRA MEAS RIGHT PROX ICA PSV: 187.6 CM/SEC
BH CV XLRA MEAS RIGHT VERTEBRAL A EDV: 13.8 CM/SEC
BH CV XLRA MEAS RIGHT VERTEBRAL A PSV: 59.2 CM/SEC
LEFT ARM BP: NORMAL MMHG
RIGHT ARM BP: NORMAL MMHG

## 2025-05-02 PROCEDURE — 93880 EXTRACRANIAL BILAT STUDY: CPT

## 2025-05-02 PROCEDURE — 25010000002 SULFUR HEXAFLUORIDE MICROSPH 60.7-25 MG RECONSTITUTED SUSPENSION: Performed by: INTERNAL MEDICINE

## 2025-05-02 PROCEDURE — 93306 TTE W/DOPPLER COMPLETE: CPT

## 2025-05-02 RX ADMIN — SULFUR HEXAFLUORIDE 4 ML: KIT at 10:32

## 2025-05-03 ENCOUNTER — RESULTS FOLLOW-UP (OUTPATIENT)
Dept: CARDIOLOGY | Facility: CLINIC | Age: 45
End: 2025-05-03
Payer: COMMERCIAL

## 2025-05-03 DIAGNOSIS — I65.21 STENOSIS OF RIGHT INTERNAL CAROTID ARTERY: Primary | ICD-10-CM

## 2025-05-04 LAB
AORTIC DIMENSIONLESS INDEX: 0.92 (DI)
ASCENDING AORTA: 3 CM
AV MEAN PRESS GRAD SYS DOP V1V2: 3.8 MMHG
AV VMAX SYS DOP: 122.2 CM/SEC
BH CV ECHO MEAS - ACS: 1.83 CM
BH CV ECHO MEAS - AO MAX PG: 6 MMHG
BH CV ECHO MEAS - AO ROOT DIAM: 3.1 CM
BH CV ECHO MEAS - AO V2 VTI: 22 CM
BH CV ECHO MEAS - IVS/LVPW: 0.77 CM
BH CV ECHO MEAS - IVSD: 1 CM
BH CV ECHO MEAS - LA DIMENSION: 4.6 CM
BH CV ECHO MEAS - LAT PEAK E' VEL: 13.6 CM/SEC
BH CV ECHO MEAS - LV MAX PG: 4 MMHG
BH CV ECHO MEAS - LV MEAN PG: 1.6 MMHG
BH CV ECHO MEAS - LV V1 MAX: 100 CM/SEC
BH CV ECHO MEAS - LV V1 VTI: 20.3 CM
BH CV ECHO MEAS - LVIDD: 4.9 CM
BH CV ECHO MEAS - LVIDS: 3.5 CM
BH CV ECHO MEAS - LVPWD: 1.3 CM
BH CV ECHO MEAS - MED PEAK E' VEL: 9.2 CM/SEC
BH CV ECHO MEAS - MV A MAX VEL: 69.2 CM/SEC
BH CV ECHO MEAS - MV E MAX VEL: 77.8 CM/SEC
BH CV ECHO MEAS - MV E/A: 1.12
BH CV ECHO MEAS - MV MAX PG: 4.2 MMHG
BH CV ECHO MEAS - MV MEAN PG: 2.1 MMHG
BH CV ECHO MEAS - MV V2 VTI: 27.3 CM
BH CV ECHO MEAS - RVDD: 3.6 CM
BH CV ECHO MEAS - TAPSE (>1.6): 2.01 CM
BH CV ECHO MEASUREMENTS AVERAGE E/E' RATIO: 6.82
BH CV XLRA - TDI S': 10 CM/SEC
IVRT: 67 MS
LV EF 2D ECHO EST: 55 %

## 2025-05-05 NOTE — TELEPHONE ENCOUNTER
Per Marisabel:  Echocardiogram shows normal heart muscle function and no significant valve disease noted. Follow up as scheduled. Notify office of any new/worsening cardiac symptoms.    Spoke with patient, patient is aware and verbalized understanding.

## 2025-06-03 ENCOUNTER — HOSPITAL ENCOUNTER (OUTPATIENT)
Dept: CT IMAGING | Facility: HOSPITAL | Age: 45
Discharge: HOME | End: 2025-06-03
Admitting: NURSE PRACTITIONER
Payer: COMMERCIAL

## 2025-06-03 DIAGNOSIS — I65.21 STENOSIS OF RIGHT INTERNAL CAROTID ARTERY: ICD-10-CM

## 2025-06-03 LAB
CREAT BLDA-MCNC: 0.6 MG/DL (ref 0.6–1.3)
EGFRCR SERPLBLD CKD-EPI 2021: 122.8 ML/MIN/1.73

## 2025-06-03 PROCEDURE — 82565 ASSAY OF CREATININE: CPT

## 2025-06-03 PROCEDURE — 25510000001 IOPAMIDOL PER 1 ML: Performed by: NURSE PRACTITIONER

## 2025-06-03 PROCEDURE — 70498 CT ANGIOGRAPHY NECK: CPT

## 2025-06-03 RX ORDER — IOPAMIDOL 755 MG/ML
100 INJECTION, SOLUTION INTRAVASCULAR
Status: COMPLETED | OUTPATIENT
Start: 2025-06-03 | End: 2025-06-03

## 2025-06-03 RX ADMIN — IOPAMIDOL 100 ML: 755 INJECTION, SOLUTION INTRAVENOUS at 13:28

## 2025-06-04 ENCOUNTER — RESULTS FOLLOW-UP (OUTPATIENT)
Dept: CARDIOLOGY | Facility: CLINIC | Age: 45
End: 2025-06-04
Payer: COMMERCIAL

## 2025-06-04 DIAGNOSIS — I65.21 STENOSIS OF RIGHT INTERNAL CAROTID ARTERY: Primary | ICD-10-CM

## 2025-06-04 DIAGNOSIS — I65.22 CAROTID ARTERY STENOSIS, SYMPTOMATIC, LEFT: ICD-10-CM

## 2025-06-06 ENCOUNTER — TELEPHONE (OUTPATIENT)
Dept: CARDIOLOGY | Facility: CLINIC | Age: 45
End: 2025-06-06
Payer: COMMERCIAL

## 2025-06-06 NOTE — TELEPHONE ENCOUNTER
Procedure: Epidural Steroid Injection    Med Directive: Plavix 7 days prior 24H post    PMH: CAD, CABG 2023, PSVT, HTN, HLD, carotid stenosis     Last Seen: 3/12/2025    CTA carotid 6/3/2025  Cervical atherosclerotic changes are present, including around 50% narrowing of the right proximal ICA. There is also occlusion of the proximal and mid cervical right vertebral artery which is favored chronic.    How Severe Is It?: moderate Is This A New Presentation, Or A Follow-Up?: Rash

## 2025-06-19 ENCOUNTER — OFFICE VISIT (OUTPATIENT)
Age: 45
End: 2025-06-19
Payer: COMMERCIAL

## 2025-06-19 VITALS
SYSTOLIC BLOOD PRESSURE: 154 MMHG | HEART RATE: 75 BPM | RESPIRATION RATE: 18 BRPM | OXYGEN SATURATION: 97 % | DIASTOLIC BLOOD PRESSURE: 93 MMHG | TEMPERATURE: 97.7 F

## 2025-06-19 DIAGNOSIS — I65.22 CAROTID ARTERY STENOSIS, SYMPTOMATIC, LEFT: Primary | ICD-10-CM

## 2025-06-19 NOTE — PROGRESS NOTES
[facility] Vascular Surgery New Patient Office Note     Date of Encounter: 06/19/2025     MRN Number: [id number]  Name: Guevara Joseph Jr.  Phone Number: [phone]     Referred By: Marisabel Dunn A*  PCP: Mary Castano APRN    Chief Complaint:    Chief Complaint   Patient presents with    Carotid Artery Disease     Patient is a 44 year old male that presents to the clinic from his cardiologist. Patient has had a carotid duplex and a CTA of the carotids within the last month.        Subjective      History of Present Illness:   History of Present Illness  Guevara Diaz, a male patient with a history of heart attack, presents for follow-up of carotid artery imaging. He denies any history of stroke, mini-stroke, or episodes of blindness in either eye. The patient is a former smoker who has successfully quit. He has diabetes and is currently taking aspirin and cholesterol medication as part of his treatment regimen.    ROS  General ROS: no chills, fatigue, fever or malaise  Psychological ROS: no anxiety and depression  Ophthalmic ROS: no blurry vision, decreased vision or loss of vision  ENT ROS: no headaches or vertigo  Allergy and Immunology ROS: no nasal congestion  Hematological and Lymphatic ROS: no bleeding problems, fatigue, jaundice, swollen lymph nodes or weight loss  Endocrine ROS: no temperature intolerance or unexpected weight changes  Respiratory ROS: no cough, shortness of breath, or wheezing  Cardiovascular ROS: no chest pain or dyspnea on exertion  Gastrointestinal ROS: no abdominal pain, change in bowel habits, or black or bloody stools  Genito-Urinary ROS: no dysuria, trouble voiding, or hematuria  Musculoskeletal ROS: negative  Neurological ROS: no TIA or stroke symptoms  Dermatological ROS: no  rash    I have reviewed the following portions of the patient's history:      Allergies   Allergen Reactions    Metformin Diarrhea         Current Outpatient Medications:      amantadine (SYMMETREL) 100 MG tablet, , Disp: , Rfl:     amiodarone (PACERONE) 100 MG tablet, Take 1 tablet by mouth Daily., Disp: 90 tablet, Rfl: 3    Aspirin Low Dose 81 MG EC tablet, Take 1 tablet by mouth Daily., Disp: , Rfl:     carvedilol (COREG) 25 MG tablet, Take 1 tablet by mouth 2 (Two) Times a Day With Meals., Disp: , Rfl:     clopidogrel (PLAVIX) 75 MG tablet, Take 1 tablet by mouth Daily., Disp: , Rfl:     DULoxetine (CYMBALTA) 60 MG capsule, Take 1 capsule by mouth Daily., Disp: , Rfl:     famotidine (PEPCID) 20 MG tablet, Take 1 tablet by mouth Every 12 (Twelve) Hours., Disp: , Rfl:     fluconazole (DIFLUCAN) 150 MG tablet, Take 1 tablet by mouth Daily., Disp: , Rfl:     gabapentin (NEURONTIN) 600 MG tablet, Take 1 tablet by mouth 3 (Three) Times a Day., Disp: , Rfl:     HYDROcodone-acetaminophen (NORCO) 5-325 MG per tablet, Take 1 tablet by mouth Every 8 (Eight) Hours As Needed for Moderate Pain or Severe Pain., Disp: 21 tablet, Rfl: 0    Insulin Glargine (Lantus SoloStar) 100 UNIT/ML injection pen, , Disp: , Rfl:     losartan (COZAAR) 100 MG tablet, Take 1 tablet by mouth Daily., Disp: , Rfl:     magnesium oxide (MAG-OX) 400 MG tablet, Take 1 tablet by mouth Daily. with food, Disp: , Rfl:     Repatha solution prefilled syringe injection, ADMINISTER 1ML UNDER THE SKIN EVERY 14 DAYS, Disp: , Rfl:     rOPINIRole (REQUIP) 1 MG tablet, Take 1 tablet by mouth 3 (Three) Times a Day., Disp: , Rfl:     rosuvastatin (CRESTOR) 40 MG tablet, Take 1 tablet by mouth Daily., Disp: , Rfl:     vitamin D (ERGOCALCIFEROL) 1.25 MG (18065 UT) capsule capsule, Take 1 capsule by mouth 1 (One) Time Per Week., Disp: , Rfl:     Past Medical History:   Diagnosis Date    Arthritis     Claustrophobia     COPD (chronic obstructive pulmonary disease)     Diabetes mellitus     Heart attack     x2    Hyperlipidemia     Hypertension     Low back pain     Lumbosacral disc disease     Peripheral neuropathy        Past Surgical  History:   Procedure Laterality Date    ARTERIAL BYPASS SURGERY  January 2023    CARDIAC SURGERY  2023    triple bypass    GALLBLADDER SURGERY      MOUTH SURGERY      all teeth removed    SKIN GRAFT Right     VASCULAR SURGERY         Social History     Socioeconomic History    Marital status:    Tobacco Use    Smoking status: Former     Current packs/day: 1.50     Average packs/day: 1.5 packs/day for 15.0 years (22.5 ttl pk-yrs)     Types: Cigarettes     Passive exposure: Past    Smokeless tobacco: Never   Vaping Use    Vaping status: Every Day    Substances: Nicotine, Flavoring    Devices: Disposable   Substance and Sexual Activity    Alcohol use: Not Currently    Drug use: Never    Sexual activity: Not Currently        Family History   Problem Relation Age of Onset    Asthma Mother     Heart disease Mother     Diabetes Mother     Stroke Father     COPD Father     Heart disease Father     Diabetes Father     Diabetes Sister     Other (Other) Maternal Aunt         heart attack    Diabetes Maternal Aunt     Other (Other) Maternal Grandmother         heart attack    Other (Other) Paternal Grandmother         heart attack       Objective     Physical Exam:  Vitals:    06/19/25 1443   BP: 154/93   BP Location: Right arm   Patient Position: Sitting   Cuff Size: Large Adult   Pulse: 75   Resp: 18   Temp: 97.7 °F (36.5 °C)   TempSrc: Oral   SpO2: 97%   PainSc: 6    PainLoc: Back      There is no height or weight on file to calculate BMI.          Physical Exam  General Appearance:    Alert, cooperative, in no acute distress   Head:    Normocephalic, without obvious abnormality, atraumatic   Eyes:               Lids and lashes normal, conjunctivae and sclerae normal, no icterus, no pallor, corneas clear, PERRL   Ears:    Ears appear intact with no abnormalities noted   Throat:   No oral lesions, no thrush,oral mucosa moist   Neck:   No adenopathy, supple, trachea midline,no carotid bruit, no JVD   Back:     No  kyphosis present, no scoliosis present, no skin lesions,  erythema or scars, no tenderness to percussion or                   palpation, range of motion normal   Lungs:     Clear to auscultation,respirations regular, even and               unlabored    Heart:    Regular rhythm and normal rate, normal S1 and S2, no         murmur, no gallop, no rub, no click   Abdomen:     Normal bowel sounds, no masses, no organomegaly, soft     non-tender, non-distended, no guarding, no rebound                tenderness   Extremities:   Moves all extremities well, no edema, no cyanosis, no           redness   Pulses:   Pulses palpable and equal bilaterally   Skin:   No bleeding, bruising or rash   Lymph nodes:   No palpable adenopathy   Neurologic:   Cranial nerves 2 - 12 grossly intact, sensation intact     Results Review:   I reviewed the patient's new clinical results.  I reviewed the patient's new imaging results and agree with the interpretation.    CT Angiogram Carotids  Result Date: 6/3/2025  Impression: Cervical atherosclerotic changes are present, including around 50% narrowing of the right proximal ICA. There is also occlusion of the proximal and mid cervical right vertebral artery which is favored chronic. Electronically Signed: Mc Roa MD  6/3/2025 4:52 PM EDT  Workstation ID: [id number]       Assessment / Plan      Assessment & Plan  Carotid artery stenosis, symptomatic, left    Medical Decision Making  Guevara Diaz is a male patient with a history of heart attack presenting for evaluation of carotid artery stenosis. CT scans and ultrasounds of the neck blood vessels were reviewed. The anterior carotid arteries appear normal without significant stenosis. The posterior vessels show mild mid-segment narrowing, which does not require intervention. Given the patient's history of cardiovascular disease and the presence of risk factors such as diabetes (as evidenced by discussion of hemoglobin A1c), the focus is on  secondary prevention. The current management strategy includes continuing aspirin and cholesterol medication, along with lifestyle modifications to address modifiable risk factors. Annual surveillance of carotid arteries is planned to monitor for progression of stenosis.     Schedule follow-up appointment in one year   Repeat carotid duplex ultrasound at next visit   Continue taking baby aspirin and cholesterol medication   Maintain healthy lifestyle and try to lose weight   Monitor and control hemoglobin A1c levels   Contact the office if any issues arise before next appointment         Follow Up:   No follow-ups on file.   Or sooner for any further concerns or worsening sign and symptoms. If unable to reach us in the office please dial 911 or go to the nearest emergency department.    Junior Shyam MD  [facility]

## 2025-06-19 NOTE — ASSESSMENT & PLAN NOTE
Medical Decision Making  Guevara Diaz is a male patient with a history of heart attack presenting for evaluation of carotid artery stenosis. CT scans and ultrasounds of the neck blood vessels were reviewed. The anterior carotid arteries appear normal without significant stenosis. The posterior vessels show mild mid-segment narrowing, which does not require intervention. Given the patient's history of cardiovascular disease and the presence of risk factors such as diabetes (as evidenced by discussion of hemoglobin A1c), the focus is on secondary prevention. The current management strategy includes continuing aspirin and cholesterol medication, along with lifestyle modifications to address modifiable risk factors. Annual surveillance of carotid arteries is planned to monitor for progression of stenosis.     Schedule follow-up appointment in one year   Repeat carotid duplex ultrasound at next visit   Continue taking baby aspirin and cholesterol medication   Maintain healthy lifestyle and try to lose weight   Monitor and control hemoglobin A1c levels   Contact the office if any issues arise before next appointment